# Patient Record
Sex: FEMALE | Race: WHITE | NOT HISPANIC OR LATINO | URBAN - METROPOLITAN AREA
[De-identification: names, ages, dates, MRNs, and addresses within clinical notes are randomized per-mention and may not be internally consistent; named-entity substitution may affect disease eponyms.]

---

## 2017-01-01 ENCOUNTER — HOME CARE/HOSPICE - HEALTHEAST (OUTPATIENT)
Dept: HOSPICE | Facility: HOSPICE | Age: 82
End: 2017-01-01

## 2017-01-01 ENCOUNTER — HOME CARE/HOSPICE - HEALTHEAST (OUTPATIENT)
Dept: ADMINISTRATIVE | Facility: OTHER | Age: 82
End: 2017-01-01

## 2017-01-01 ENCOUNTER — HOME CARE/HOSPICE - HEALTHEAST (OUTPATIENT)
Dept: HOME HEALTH SERVICES | Facility: HOME HEALTH | Age: 82
End: 2017-01-01

## 2017-01-01 RX ORDER — HALOPERIDOL 2 MG/ML
1 SOLUTION ORAL EVERY 4 HOURS PRN
Status: SHIPPED | COMMUNITY
Start: 2017-01-01

## 2017-01-01 RX ORDER — BISACODYL 10 MG
10 SUPPOSITORY, RECTAL RECTAL DAILY PRN
Status: SHIPPED | COMMUNITY
Start: 2017-01-01

## 2017-01-01 RX ORDER — LORAZEPAM 2 MG/ML
0.5 CONCENTRATE ORAL EVERY 4 HOURS
Status: SHIPPED | COMMUNITY
Start: 2017-01-01

## 2017-01-01 RX ORDER — HYDROMORPHONE HYDROCHLORIDE 1 MG/ML
2 SOLUTION ORAL
Status: SHIPPED | COMMUNITY
Start: 2017-01-01

## 2017-01-01 ASSESSMENT — MIFFLIN-ST. JEOR
SCORE: 859.48
SCORE: 859.48

## 2021-05-27 ENCOUNTER — RECORDS - HEALTHEAST (OUTPATIENT)
Dept: ADMINISTRATIVE | Facility: CLINIC | Age: 86
End: 2021-05-27

## 2021-05-29 ENCOUNTER — RECORDS - HEALTHEAST (OUTPATIENT)
Dept: ADMINISTRATIVE | Facility: CLINIC | Age: 86
End: 2021-05-29

## 2021-05-30 ENCOUNTER — RECORDS - HEALTHEAST (OUTPATIENT)
Dept: ADMINISTRATIVE | Facility: CLINIC | Age: 86
End: 2021-05-30

## 2021-05-31 VITALS — WEIGHT: 114 LBS | HEIGHT: 61 IN | BODY MASS INDEX: 21.52 KG/M2

## 2021-05-31 VITALS — HEIGHT: 61 IN | BODY MASS INDEX: 21.52 KG/M2 | WEIGHT: 114 LBS

## 2021-06-03 ENCOUNTER — RECORDS - HEALTHEAST (OUTPATIENT)
Dept: ADMINISTRATIVE | Facility: CLINIC | Age: 86
End: 2021-06-03

## 2021-06-11 NOTE — PROGRESS NOTES
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 She was seen today in her home in the presence of one of her daughters and one son, as well as her hospice case manager, Suyapa, and hospice social worker, Jeannette. Purpose was to establish a therapeutic relationship, provide education to family re expected decline, and to confirm her prognosis as less than 6 months, if her disease progresses in a natural fashion. Daughter did most of the talking. Patient is non-verbal, but can signal \"yes\" or \"no\" with the fingers of her right hand. This was not consistent to an untrained eye, but family seemed to be able to interpret very small movements of her hand or head. She did smile at some jokes and teared up during discussions of how much her family loved her. So, she has some understanding of what is going on around her. She is severely hard of hearing, so uses a headset and microphone for individuals to communicate with her.     Her daughter reports her intake of liquids has decreased to barely 4-5 ounces daily. She must be fed, and often will take only bites. She is up in her chair 2-3 hours daily, but spends the rest of her time in bed due to fatigue. She sleeps greater than 18 hours daily. She is unable to stand on a scale for accurate weight measurements.    Exam: Patient was sitting at the kitchen table, leaning to the left, initially, but later tipped more to the right. Her left side is immobile and contracted. Her right hand can move slowly and stiffly. Mouth dry, but symmetric. Neck supple without nodes. Heart very irregular and too tachycardic to count during my exam. No obvious murmurs. Lungs were very diminished due to extremely shallow respirations, but without rales or rhonchi. Abdomen had decreased bowel sounds, non-tender. Extremities were cool, thin, with tenting skin, but no edema.    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 She is clearly losing weight from dramatic malnutrition. This is worsening as seh begins to pocket food and refuse to finish even a few bites, at times. She is also taking very little fluid, so is at risk for seere dehydration. Her life expectancy is certainly less than 6 months. I provided education re the progression of dementia and the typical loss of ability to swallow, and the normal loss of appetite. I encouraged family to only feed her what she seems interested in and not to try to force feed her. I stressed that this is a natural progression of this disease. I offered a follow up visit, if needed, but will be in touch with Suyapa frequently and I am available to hospice staff via phone at all times.     Total time spent was 35 minutes. Greater than 50% was in education, emotional support and case management.